# Patient Record
Sex: FEMALE | Race: WHITE | Employment: STUDENT | URBAN - METROPOLITAN AREA
[De-identification: names, ages, dates, MRNs, and addresses within clinical notes are randomized per-mention and may not be internally consistent; named-entity substitution may affect disease eponyms.]

---
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
## 2020-03-04 ENCOUNTER — OFFICE VISIT (OUTPATIENT)
Dept: DERMATOLOGY CLINIC | Facility: CLINIC | Age: 20
End: 2020-03-04
Payer: COMMERCIAL

## 2020-03-04 DIAGNOSIS — L70.0 ACNE VULGARIS: ICD-10-CM

## 2020-03-04 DIAGNOSIS — B07.9 VERRUCA(E): Primary | ICD-10-CM

## 2020-03-04 PROCEDURE — 99213 OFFICE O/P EST LOW 20 MIN: CPT | Performed by: DERMATOLOGY

## 2020-03-04 RX ORDER — AZELAIC ACID 0.15 G/G
GEL TOPICAL
Qty: 50 G | Refills: 6 | Status: SHIPPED | OUTPATIENT
Start: 2020-03-04 | End: 2021-08-24

## 2020-03-04 RX ORDER — CLINDAMYCIN PHOSPHATE 10 MG/ML
1 LOTION TOPICAL 2 TIMES DAILY
Qty: 60 ML | Refills: 11 | Status: SHIPPED | OUTPATIENT
Start: 2020-03-04 | End: 2020-04-03

## 2020-03-04 RX ORDER — POLYETHYLENE GLYCOL-3350 AND ELECTROLYTES 236; 6.74; 5.86; 2.97; 22.74 G/274.31G; G/274.31G; G/274.31G; G/274.31G; G/274.31G
POWDER, FOR SOLUTION ORAL
COMMUNITY
Start: 2019-10-01 | End: 2020-03-19

## 2020-03-06 ENCOUNTER — APPOINTMENT (OUTPATIENT)
Dept: CT IMAGING | Facility: HOSPITAL | Age: 20
End: 2020-03-06
Attending: NURSE PRACTITIONER
Payer: COMMERCIAL

## 2020-03-06 ENCOUNTER — HOSPITAL ENCOUNTER (EMERGENCY)
Facility: HOSPITAL | Age: 20
Discharge: HOME | End: 2020-03-06
Payer: COMMERCIAL

## 2020-03-06 VITALS
WEIGHT: 140 LBS | TEMPERATURE: 98 F | RESPIRATION RATE: 18 BRPM | DIASTOLIC BLOOD PRESSURE: 67 MMHG | HEIGHT: 64 IN | OXYGEN SATURATION: 98 % | SYSTOLIC BLOOD PRESSURE: 109 MMHG | HEART RATE: 70 BPM | BODY MASS INDEX: 23.9 KG/M2

## 2020-03-06 DIAGNOSIS — R10.84 ABDOMINAL PAIN, GENERALIZED: Primary | ICD-10-CM

## 2020-03-06 LAB
ALBUMIN SERPL-MCNC: 4.5 G/DL (ref 3.4–5)
ALP LIVER SERPL-CCNC: 58 U/L (ref 52–144)
ALT SERPL-CCNC: 37 U/L (ref 13–56)
ANION GAP SERPL CALC-SCNC: 7 MMOL/L (ref 0–18)
AST SERPL-CCNC: 33 U/L (ref 15–37)
B-HCG UR QL: NEGATIVE
BASOPHILS # BLD AUTO: 0.04 X10(3) UL (ref 0–0.2)
BASOPHILS NFR BLD AUTO: 0.4 %
BILIRUB DIRECT SERPL-MCNC: 0.1 MG/DL (ref 0–0.2)
BILIRUB SERPL-MCNC: 0.5 MG/DL (ref 0.1–2)
BILIRUB UR QL: NEGATIVE
BUN BLD-MCNC: 11 MG/DL (ref 7–18)
BUN/CREAT SERPL: 15.1 (ref 10–20)
CALCIUM BLD-MCNC: 9.8 MG/DL (ref 8.5–10.1)
CHLORIDE SERPL-SCNC: 103 MMOL/L (ref 98–112)
CLARITY UR: CLEAR
CO2 SERPL-SCNC: 29 MMOL/L (ref 21–32)
COLOR UR: YELLOW
CREAT BLD-MCNC: 0.73 MG/DL (ref 0.55–1.02)
DEPRECATED RDW RBC AUTO: 39.8 FL (ref 35.1–46.3)
EOSINOPHIL # BLD AUTO: 0.06 X10(3) UL (ref 0–0.7)
EOSINOPHIL NFR BLD AUTO: 0.6 %
ERYTHROCYTE [DISTWIDTH] IN BLOOD BY AUTOMATED COUNT: 12.3 % (ref 11–15)
GLUCOSE BLD-MCNC: 102 MG/DL (ref 70–99)
GLUCOSE UR-MCNC: NEGATIVE MG/DL
HCT VFR BLD AUTO: 44.3 % (ref 35–48)
HGB BLD-MCNC: 14.7 G/DL (ref 12–16)
HGB UR QL STRIP.AUTO: NEGATIVE
IMM GRANULOCYTES # BLD AUTO: 0.02 X10(3) UL (ref 0–1)
IMM GRANULOCYTES NFR BLD: 0.2 %
KETONES UR-MCNC: NEGATIVE MG/DL
LEUKOCYTE ESTERASE UR QL STRIP.AUTO: NEGATIVE
LYMPHOCYTES # BLD AUTO: 2.35 X10(3) UL (ref 1.5–5)
LYMPHOCYTES NFR BLD AUTO: 23.1 %
M PROTEIN MFR SERPL ELPH: 7.8 G/DL (ref 6.4–8.2)
MCH RBC QN AUTO: 29.3 PG (ref 26–34)
MCHC RBC AUTO-ENTMCNC: 33.2 G/DL (ref 31–37)
MCV RBC AUTO: 88.2 FL (ref 80–100)
MONOCYTES # BLD AUTO: 0.67 X10(3) UL (ref 0.1–1)
MONOCYTES NFR BLD AUTO: 6.6 %
NEUTROPHILS # BLD AUTO: 7.05 X10 (3) UL (ref 1.5–7.7)
NEUTROPHILS # BLD AUTO: 7.05 X10(3) UL (ref 1.5–7.7)
NEUTROPHILS NFR BLD AUTO: 69.1 %
NITRITE UR QL STRIP.AUTO: NEGATIVE
OSMOLALITY SERPL CALC.SUM OF ELEC: 288 MOSM/KG (ref 275–295)
PH UR: 6 [PH] (ref 5–8)
PLATELET # BLD AUTO: 292 10(3)UL (ref 150–450)
POTASSIUM SERPL-SCNC: 4.1 MMOL/L (ref 3.5–5.1)
PROT UR-MCNC: NEGATIVE MG/DL
RBC # BLD AUTO: 5.02 X10(6)UL (ref 3.8–5.3)
SODIUM SERPL-SCNC: 139 MMOL/L (ref 136–145)
SP GR UR STRIP: 1.01 (ref 1–1.03)
UROBILINOGEN UR STRIP-ACNC: <2
WBC # BLD AUTO: 10.2 X10(3) UL (ref 4–11)

## 2020-03-06 PROCEDURE — 96360 HYDRATION IV INFUSION INIT: CPT

## 2020-03-06 PROCEDURE — 99284 EMERGENCY DEPT VISIT MOD MDM: CPT

## 2020-03-06 PROCEDURE — 81003 URINALYSIS AUTO W/O SCOPE: CPT

## 2020-03-06 PROCEDURE — 74177 CT ABD & PELVIS W/CONTRAST: CPT | Performed by: NURSE PRACTITIONER

## 2020-03-06 PROCEDURE — 81025 URINE PREGNANCY TEST: CPT

## 2020-03-06 PROCEDURE — 80048 BASIC METABOLIC PNL TOTAL CA: CPT

## 2020-03-06 PROCEDURE — 80076 HEPATIC FUNCTION PANEL: CPT | Performed by: NURSE PRACTITIONER

## 2020-03-06 PROCEDURE — 85025 COMPLETE CBC W/AUTO DIFF WBC: CPT

## 2020-03-06 PROCEDURE — 96361 HYDRATE IV INFUSION ADD-ON: CPT

## 2020-03-06 RX ORDER — DICYCLOMINE HYDROCHLORIDE 10 MG/1
10 CAPSULE ORAL ONCE
Status: COMPLETED | OUTPATIENT
Start: 2020-03-06 | End: 2020-03-06

## 2020-03-06 RX ORDER — DICYCLOMINE HCL 20 MG
20 TABLET ORAL 4 TIMES DAILY PRN
Qty: 15 TABLET | Refills: 0 | Status: SHIPPED | OUTPATIENT
Start: 2020-03-06 | End: 2020-03-19

## 2020-03-06 NOTE — ED INITIAL ASSESSMENT (HPI)
Left flank pain radiating to left lower abdomen worsening for >2 months with constipation. Started colonoscopy prep yesterday at 1800 but stopped due to bloating, nausea, and vomiting.    Enema this morning made bloating worse now with severe abdominal pa

## 2020-03-06 NOTE — ED PROVIDER NOTES
Patient Seen in: [facility] AND [facility] Emergency Department      History   Patient presents with:  Abdomen/Flank Pain    Stated Complaint: abdominal pressure after drinking colonscopy mixture    HPI    23year old female presents to the ER complaining of sev Conjunctivae normal.   Neck:      Musculoskeletal: Neck supple. Cardiovascular:      Rate and Rhythm: Normal rate. Heart sounds: No murmur. Pulmonary:      Effort: Pulmonary effort is normal.      Breath sounds: Normal breath sounds.    Abdominal: hemodynamically stable and was instructed to follow-up with her GI specialist.   Strict return precautions given.     Imaging:   CT of the abdomen and pelvis with no acute intra-abdominal findings              Clinical impression as well as any lab results

## 2020-03-16 NOTE — PROGRESS NOTES
Prince Ng is a 23year old female. Patient presents with:  Verruca: LOV 10/7/19. pt presenting today with verruca f/u to R foot. Treated with cryo at last with improvement. Patient has no known allergies.   Current Outpatient Medicat Single      Spouse name: Not on file      Number of children: Not on file      Years of education: Not on file      Highest education level: Not on file    Occupational History      Occupation: senior at [employer]; [address] Self-Exams: Not Asked    Social History Narrative      Not on file    Family History   Problem Relation Age of Onset   • Lipids Father    • Hypertension Maternal Grandmother    • Cancer Neg    • Diabetes Neg    • Heart Disorder Neg                       HP legs, palms.   Remarkable for lesions as noted dermal papular nodule right arm consistent with dermatofibroma 8 mm, unchanged patchy area of alopecia at left anterior scalp overall improved with decreased density bi temporal scalp slightly; numerous hairs o recommend observation. Excision with plastics if desired    No other suspicious nevi. Pathophysiology discussed with patient. Therapeutic options reviewed. See  Medications in grid. Instructions reviewed at length.      General skin care questions an

## 2020-08-29 NOTE — ED NOTES
PT safe to DC home per MD. Liudmila Abreu to dress self. DC teaching done, pt verbalizes understanding. Ambulatory with steady gait to exit.
Yes - the patient is able to be screened

## 2021-03-03 ENCOUNTER — OFFICE VISIT (OUTPATIENT)
Dept: DERMATOLOGY CLINIC | Facility: CLINIC | Age: 21
End: 2021-03-03
Payer: COMMERCIAL

## 2021-03-03 DIAGNOSIS — L25.9 CONTACT DERMATITIS AND ECZEMA: ICD-10-CM

## 2021-03-03 DIAGNOSIS — B07.9 VERRUCA(E): ICD-10-CM

## 2021-03-03 DIAGNOSIS — D48.5 NEOPLASM OF UNCERTAIN BEHAVIOR OF SKIN: Primary | ICD-10-CM

## 2021-03-03 DIAGNOSIS — D23.9 DERMATOFIBROMA: ICD-10-CM

## 2021-03-03 PROCEDURE — 99213 OFFICE O/P EST LOW 20 MIN: CPT | Performed by: DERMATOLOGY

## 2021-03-14 NOTE — PROGRESS NOTES
Kenzie Encinas is a 21year old female. Patient presents with:  Warts: LOV 3/4/20. Warts on right foot. Cyst: Dime sized cyst on the right arm. Has been there a long time. Want to make sure it is ok. Would like a surgeon to remove it. daily. (Patient not taking: Reported on 3/3/2021 ) 1 Inhaler 11   • Levocetirizine Dihydrochloride (XYZAL) 5 MG Oral Tab Take 1 tablet (5 mg total) by mouth every evening.  (Patient not taking: Reported on 1/12/2021 ) 30 tablet 6   • Azelaic Acid (FINACEA) Not on file    Social Determinants of Health  Financial Resource Strain:       Difficulty of Paying Living Expenses:   Food Insecurity:       Worried About Running Out of Food in the Last Year:       Ran Out of Food in the Last Year:   Transportation Ne above conditions    Labs from last year reviewed.   Hormonal studies unremarkable including testosterone levels, TSH 17 hydroxyprogesterone tissue transglutaminase and a negative sed rate as well ,ferritin 51    Outside records reviewed as well  Patient pre slightly scaly red. Will use hydrocortisone, continue CeraVe a moisturizer, consider prescription topical steroids if worsening. Possible early rosacea with more background erythema tightness inflammatory lesions over the cheeks.   Metronidazole twice is[place] affected areas of face       Verruca(e)  (primary encounter diagnosis)  Alopecia areata  Hair loss  Benign neoplasm of skin of upper limb, including shoulder, unspecified laterality  Benign neoplasm of skin, unspecified location    No orders of the defined

## 2021-05-03 PROBLEM — K30 FUNCTIONAL DYSPEPSIA: Status: ACTIVE | Noted: 2021-05-03

## 2021-05-03 PROBLEM — K58.1 IRRITABLE BOWEL SYNDROME WITH CONSTIPATION: Status: ACTIVE | Noted: 2021-05-03

## 2021-06-25 ENCOUNTER — OFFICE VISIT (OUTPATIENT)
Dept: DERMATOLOGY CLINIC | Facility: CLINIC | Age: 21
End: 2021-06-25
Payer: COMMERCIAL

## 2021-06-25 DIAGNOSIS — K13.0 CHEILITIS: ICD-10-CM

## 2021-06-25 DIAGNOSIS — L30.9 DERMATITIS: Primary | ICD-10-CM

## 2021-06-25 PROCEDURE — 99213 OFFICE O/P EST LOW 20 MIN: CPT | Performed by: DERMATOLOGY

## 2021-06-25 RX ORDER — FLUTICASONE PROPIONATE 0.05 MG/G
OINTMENT TOPICAL
Qty: 15 G | Refills: 1 | Status: SHIPPED | OUTPATIENT
Start: 2021-06-25 | End: 2021-08-25

## 2021-06-25 RX ORDER — AZELASTINE 1 MG/ML
SPRAY, METERED NASAL
COMMUNITY
Start: 2021-05-22 | End: 2021-08-24

## 2021-06-25 RX ORDER — METRONIDAZOLE 7.5 MG/G
GEL TOPICAL
Qty: 60 G | Refills: 11 | Status: SHIPPED | OUTPATIENT
Start: 2021-06-25 | End: 2021-08-25

## 2021-06-25 RX ORDER — NAPROXEN 500 MG/1
500 TABLET ORAL 2 TIMES DAILY
COMMUNITY
Start: 2021-03-18 | End: 2021-08-24

## 2021-07-05 NOTE — PROGRESS NOTES
Roxi Gonzalez is a 21year old female. Patient presents with:  Derm Problem: LOV 3/321/. pt presenting today with dryness and peeling to top lip for about 3-4 months. Denies itching or pain. pt states dryness aomce and goes.  pt noticed some swelli 0.005 % External Ointment Use at bedtime to rash on lips 15 g 1   • Ergocalciferol (VITAMIN D OR) Take by mouth.      • Azelastine HCl 0.1 % Nasal Solution USE TWO SPRAYS IN EACH NOSTRIL TWO TIMES A DAY AS NEEDED (Patient not taking: Reported on 6/25/2021) Not Asked        Occupational Exposure: Not Asked        Hobby Hazards: Not Asked        Sleep Concern: Not Asked        Stress Concern: Not Asked        Weight Concern: Not Asked        Special Diet: Not Asked        Back Care: Not Asked        Exercise: Dry scaly area on upper lip 3 months isolated lesion peels flakes puffy at times subsides. Use various products. Notes redness of the cheeks. Fine papules. Patient presents with concerns above. ROS:    Denies any other systemic complaints. at length. General skin care questions answered. Reassurance regarding benign skin lesions. Signs and symptoms of skin cancer, ABCDE's of melanoma briefly reviewed. Sunscreen use, sun protection, encouraged. Followup as noted in rtc or p.r.n.     The

## 2021-11-04 ENCOUNTER — TELEPHONE (OUTPATIENT)
Dept: DERMATOLOGY CLINIC | Facility: CLINIC | Age: 21
End: 2021-11-04

## 2021-11-04 RX ORDER — CLINDAMYCIN PHOSPHATE 10 MG/ML
1 LOTION TOPICAL 2 TIMES DAILY
Qty: 60 ML | Refills: 5 | Status: SHIPPED | OUTPATIENT
Start: 2021-11-04

## 2021-11-04 NOTE — TELEPHONE ENCOUNTER
LOV 6/25/21 - Pt also seen at [facility] in October 2021. Pt asking for refill of Cleocin. Please advise if you would like this refilled?

## 2021-11-04 NOTE — TELEPHONE ENCOUNTER
Patients mother called    Asking for refill of Clindamycin Phosphate 1% eternal lotion    lov 6/25/21

## 2021-11-30 ENCOUNTER — OFFICE VISIT (OUTPATIENT)
Dept: OPTOMETRY | Facility: CLINIC | Age: 21
End: 2021-11-30
Payer: COMMERCIAL

## 2021-11-30 DIAGNOSIS — H52.13 MYOPIA OF BOTH EYES: Primary | ICD-10-CM

## 2021-11-30 PROCEDURE — 92015 DETERMINE REFRACTIVE STATE: CPT | Performed by: OPTOMETRIST

## 2021-11-30 PROCEDURE — 92002 INTRM OPH EXAM NEW PATIENT: CPT | Performed by: OPTOMETRIST

## 2021-11-30 NOTE — PROGRESS NOTES
Griffin Fleischer is a 21year old female. HPI:     HPI     Patient is in for an annual eye exam. .Last seen at [facility] in 2017 for an EE. Was having monocular diplopia but no pathology was found. Here for a second opinion because she still sees annalee chavez [facility]       Right Left     Full, Ortho Full, Ortho          Neuro/Psych     Oriented x3: Yes    Mood/Affect: Normal          Dilation     Both eyes: declined @ 2:48 PM            Additional Tests     Amsler       Right Left     Normal Normal

## 2021-11-30 NOTE — PATIENT INSTRUCTIONS
Myopia of both eyes  Gave copy of glasses if wanted. Advised RX is mild but will help with road signs and lights especially in the evening.

## 2021-11-30 NOTE — ASSESSMENT & PLAN NOTE
Gave copy of glasses if wanted. Advised RX is mild but will help with road signs and lights especially in the evening.

## 2021-11-30 NOTE — PROGRESS NOTES
Elizabeth Jarquin is a 21year old female. HPI:     HPI     Patient is in for an annual eye exam. .Last seen at [facility] in 2017 for an EE. Was having monocular diplopia but no pathology was found. Here for a second opinion because she still sees annalee chavez [facility]       Right Left     Full, Ortho Full, Ortho          Neuro/Psych     Oriented x3: Yes    Mood/Affect: Normal          Dilation     Both eyes: declined @ 2:48 PM            Additional Tests     Amsler       Right Left     Normal Normal

## 2022-06-29 ENCOUNTER — OFFICE VISIT (OUTPATIENT)
Dept: DERMATOLOGY CLINIC | Facility: CLINIC | Age: 22
End: 2022-06-29
Payer: COMMERCIAL

## 2022-06-29 DIAGNOSIS — L60.8 NAIL DISCOLORATION: ICD-10-CM

## 2022-06-29 DIAGNOSIS — D23.9 BENIGN NEOPLASM OF SKIN, UNSPECIFIED LOCATION: ICD-10-CM

## 2022-06-29 DIAGNOSIS — L30.9 DERMATITIS: ICD-10-CM

## 2022-06-29 DIAGNOSIS — D23.9 DERMATOFIBROMA: ICD-10-CM

## 2022-06-29 DIAGNOSIS — D48.5 NEOPLASM OF UNCERTAIN BEHAVIOR OF SKIN: Primary | ICD-10-CM

## 2022-06-29 PROCEDURE — 99213 OFFICE O/P EST LOW 20 MIN: CPT | Performed by: DERMATOLOGY

## 2022-06-29 RX ORDER — CLINDAMYCIN PHOSPHATE 10 UG/ML
1 LOTION TOPICAL 2 TIMES DAILY
Qty: 60 ML | Refills: 6 | Status: SHIPPED | OUTPATIENT
Start: 2022-06-29

## 2023-07-14 ENCOUNTER — TELEPHONE (OUTPATIENT)
Dept: DERMATOLOGY CLINIC | Facility: CLINIC | Age: 23
End: 2023-07-14

## 2023-07-14 ENCOUNTER — OFFICE VISIT (OUTPATIENT)
Dept: DERMATOLOGY CLINIC | Facility: CLINIC | Age: 23
End: 2023-07-14

## 2023-07-14 DIAGNOSIS — L65.9 HAIR LOSS: ICD-10-CM

## 2023-07-14 DIAGNOSIS — L30.9 DERMATITIS: Primary | ICD-10-CM

## 2023-07-14 DIAGNOSIS — R23.8 SKIN SENSITIVITY: ICD-10-CM

## 2023-07-14 PROCEDURE — 99215 OFFICE O/P EST HI 40 MIN: CPT | Performed by: DERMATOLOGY

## 2023-07-14 RX ORDER — AZELAIC ACID 0.15 G/G
1 AEROSOL, FOAM TOPICAL DAILY
Qty: 50 G | Refills: 12 | Status: SHIPPED | OUTPATIENT
Start: 2023-07-14

## 2023-07-14 RX ORDER — RUXOLITINIB 15 MG/G
1 CREAM TOPICAL EVERY EVENING
Qty: 60 G | Refills: 2 | Status: SHIPPED | OUTPATIENT
Start: 2023-07-14 | End: 2023-07-15

## 2023-07-14 RX ORDER — CLINDAMYCIN PHOSPHATE 10 UG/ML
1 LOTION TOPICAL 2 TIMES DAILY
Qty: 60 ML | Refills: 6 | Status: SHIPPED | OUTPATIENT
Start: 2023-07-14

## 2023-07-14 RX ORDER — CLOBETASOL PROPIONATE 0.05 G/100ML
SHAMPOO TOPICAL
Qty: 120 ML | Refills: 11 | Status: SHIPPED | OUTPATIENT
Start: 2023-07-14

## 2023-07-14 NOTE — TELEPHONE ENCOUNTER
LOV today, 7/14/23 - Dr. Olman Bruno - copay is $900 with patient's insurance. Ok to send to [facility]  if pt ok with this? Thank you.

## 2023-07-15 RX ORDER — RUXOLITINIB 15 MG/G
1 CREAM TOPICAL EVERY EVENING
Qty: 60 G | Refills: 2 | Status: SHIPPED | OUTPATIENT
Start: 2023-07-15

## 2023-07-15 NOTE — TELEPHONE ENCOUNTER
For Koffi Adames? [facility] [place] and please give family info  this may still need pa.   Rx sent to THE [facility] [place]

## 2023-07-17 ENCOUNTER — LAB ENCOUNTER (OUTPATIENT)
Dept: LAB | Age: 23
End: 2023-07-17
Attending: DERMATOLOGY
Payer: COMMERCIAL

## 2023-07-17 DIAGNOSIS — L30.9 DERMATITIS: ICD-10-CM

## 2023-07-17 DIAGNOSIS — L65.9 HAIR LOSS: ICD-10-CM

## 2023-07-17 LAB
CRP SERPL-MCNC: <0.29 MG/DL (ref ?–0.3)
DEPRECATED HBV CORE AB SER IA-ACNC: 35.8 NG/ML
DHEA-S SERPL-MCNC: 227.7 UG/DL
TSI SER-ACNC: 1.09 MIU/ML (ref 0.36–3.74)
VIT D+METAB SERPL-MCNC: 22.5 NG/ML (ref 30–100)

## 2023-07-17 PROCEDURE — 84410 TESTOSTERONE BIOAVAILABLE: CPT

## 2023-07-17 PROCEDURE — 36415 COLL VENOUS BLD VENIPUNCTURE: CPT

## 2023-07-17 PROCEDURE — 82728 ASSAY OF FERRITIN: CPT

## 2023-07-17 PROCEDURE — 82306 VITAMIN D 25 HYDROXY: CPT

## 2023-07-17 PROCEDURE — 86140 C-REACTIVE PROTEIN: CPT

## 2023-07-17 PROCEDURE — 82627 DEHYDROEPIANDROSTERONE: CPT

## 2023-07-17 PROCEDURE — 84443 ASSAY THYROID STIM HORMONE: CPT

## 2023-07-17 PROCEDURE — 86038 ANTINUCLEAR ANTIBODIES: CPT

## 2023-07-18 NOTE — TELEPHONE ENCOUNTER
Left detailed message for pt's mom regarding [place] and [id number] [facility].

## 2023-07-18 NOTE — TELEPHONE ENCOUNTER
Patients mother called    Why was rx sent to [facility]? Can she have lab results.  Please call

## 2023-07-19 NOTE — TELEPHONE ENCOUNTER
S/w pt's mother (HIPAA confirmed) reviewed labs as per KMT's note:    \"  The vitamin D is a little bit low. I would recommend weekly vitamin D prescription. We will send to your pharmacy. Iron stores, ferritin, is slightly low for hair loss that should be over 50 iron supplementation over-the-counter may be helpful. Hormone levels, DHEA-S normal testosterone pending. Often it is helpful with hair shedding to supplement both vitamin D and iron together. Inflammatory marker is low thyroid normal remainder of labs are pending. I will let you know when these are back \"    Mom voiced understanding. She states she noted a msg from lab that certain labs can be skewed due to biotin - she denies pt taking biotin but would like to know which labs can be altered by it - please confirm Dr. Leatha Flowers, I can't remember if that was ferritin? TSH also?

## 2023-07-20 LAB — NUCLEAR IGG TITR SER IF: NEGATIVE {TITER}

## 2023-07-21 LAB
SEX HORM BIND GLOB: 48.5 NMOL/L
TESTOST % FREE+WEAK BND: 15.2 %
TESTOST FREE+WEAK BND: 5.1 NG/DL
TESTOSTERONE TOT /MS: 33.5 NG/DL

## 2023-07-29 NOTE — PROGRESS NOTES
Selene Shook is a 25year old female. HPI:     CC:  Patient presents with:  Derm Problem: LOV 6/29/22. Patient present for itchy scalp for 4 years. Notes, there's a waxy substance, along with white flaky dandruff. There is a burning sensation, and some hair loss. Patient tried OTC shampoo, to aid with the burning sensation with no improvement. The burning sensation returned 30 minutes after washing her hair. Allergies:  Patient has no known allergies. HISTORY:    Past Medical History:   Diagnosis Date    Abdominal pain     Constipation     Eustachian tube dysfunction     WALKER      History reviewed. No pertinent surgical history. Family History   Problem Relation Age of Onset    Lipids Father     Hypertension Maternal Grandmother     Cancer Neg     Diabetes Neg     Heart Disorder Neg     Glaucoma Neg       Social History     Socioeconomic History    Marital status: Single   Occupational History    Occupation: senior at [employer]; BME/[employer] performance   Tobacco Use    Smoking status: Never    Smokeless tobacco: Never   Vaping Use    Vaping Use: Never used   Substance and Sexual Activity    Alcohol use: No    Drug use: No   Other Topics Concern    Grew up on a farm No    History of tanning No    Outdoor occupation Yes     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 50 g 12    clindamycin 1 % External Lotion Apply 1 Application  topically 2 (two) times daily. Apply thin film to affected area(s).  60 mL 6    Ergocalciferol (VITAMIN D OR) Take by mouth.      ergocalciferol 1.25 MG (70616 UT) Oral Cap Take 1 capsule (50,000 Units total) by mouth once a week. 12 capsule 1    Ruxolitinib Phosphate (OPZELURA) 1.5 % External Cream Apply 1 Application topically every evening. 60 g 2     Allergies:   No Known Allergies    Past Medical History:   Diagnosis Date    Abdominal pain     Constipation     Eustachian tube dysfunction     WALKER     History reviewed. No pertinent surgical history.   Social History    Socioeconomic History      Marital status: Single      Spouse name: Not on file      Number of children: Not on file      Years of education: Not on file      Highest education level: Not on file    Occupational History      Occupation: senior at [place]; BME/v[employer] performance    Tobacco Use      Smoking status: Never      Smokeless tobacco: Never    Vaping Use      Vaping Use: Never used    Substance and Sexual Activity      Alcohol use: No      Drug use: No      Sexual activity: Not on file    Other Topics      Concerns:        Grew up on a farm: No        History of tanning: No        Outdoor occupation: Yes          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        Breast feeding: No        Reaction to local anesthetic: No         Service: Not Asked        Blood Transfusions: Not Asked        Caffeine Concern: Not Asked        Occupational Exposure: Not Asked        Hobby Hazards: Not Asked        Sleep Concern: Not Asked        Stress Concern: Not Asked        Weight Concern: Not Asked        Special Diet: Not Asked        Back Care: Not Asked        Exercise: Not Asked        Bike Helmet: Not Asked        Seat Belt: Yes        Self-Exams: Not Asked    Social History Narrative      Not on file    Social Determinants of Health  Financial Resource Strain: Not on file  Food Insecurity: Not on file  Transportation Needs: Not on file  Physical Activity: Not on file  Stress: Not on file  Social Connections: Not on file  Housing Stability: Not on file  Family History   Problem Relation Age of Onset    Lipids Father     Hypertension Maternal Grandmother     Cancer Neg     Diabetes Neg     Heart Disorder Neg     Glaucoma Neg        There were no vitals filed for this visit. HPI:  Patient presents with:  Derm Problem: LOV 6/29/22. Patient present for itchy scalp for 4 years. Notes, there's a waxy substance, along with white flaky dandruff. There is a burning sensation, and some hair loss. Patient tried OTC shampoo, to aid with the burning sensation with no improvement. The burning sensation returned 30 minutes after washing her hair. Follow-up several concerns. Scalp itching sensitivity has been going on for several years worsening notes some flaking and scaling which appears constantly even after washing her hair does use dandruff shampoo has tried variety of different products notes more greasy waxy buildup. Hair seems greasy by the end of the day sometimes washes hair twice a day. Hair loss has persisted stinging burning sensation at times. Does not seem to be seasonal and not related to sun exposure. Has otherwise generally been healthy. Previously labs noncontributory. Had evaluation previously at [facility] possible alopecia areata. Patient has noted more erythema and scaling on the hairline. Concerned with hairline, anterior scalp and temple decreased density. Is at school in [place]. Generally careful with sun protection. Notes more prominent acne. Menses regular. Has been doing laser hair removal for facial hair. Previous hormonal evaluation had been normal.  nothing is really new or different. Stress manageable does not seem to be related to school or activities occasionally take Zyrtec as needed for allergies    No other over-the-counter medications. Is taking vitamin D    Patient presents with concerns above. Patient has been in their usual state of health. Past notes/ records and appropriate/relevant lab results including pathology and past body maps reviewed.  Including outside notes/ PCP notes as appropriate. Updated and new information noted in current visit. ROS:  Denies other relevant systemic complaints. History, medications, allergies reviewed as noted. Physical Examination:     Well-developed well-nourished patient alert oriented in no acute distress. Exam performed, including scalp, head, neck, face,nails, hair, external eyes, including conjunctival mucosa, eyelids, lips external ears , arms, digits,palms. Multiple light to medium brown, well marginated, uniformly pigmented, macules and papules 6 mm and less scattered on exam. pigmented lesions examined with dermoscopy benign-appearing patterns. Dermatofibroma unchanged right arm    See map today's date for lesions noted . See assessment and plan below for specific lesions. Otherwise remarkable for lesions as noted on map. See A/P  below for additional information:    Assessment / plan:    Orders Placed This Encounter      Anti-Nuclear Antibody (KIMBERLY) by IFA Screen, Reflex Titer      Dehydroepiandrosterone Sulfate      Testosterone,Total and Weakly Bound w/ SHBG      C-Reactive Protein      Ferritin      Vitamin D      TSH W Reflex To Free T4      Meds & Refills for this Visit:  Requested Prescriptions     Signed Prescriptions Disp Refills    Clobetasol Propionate (CLOBEX) 0.05 % External Shampoo 120 mL 11     Sig: Apply to scalp leave on 5-10mintues, lather, rinse out 3 times weekly as directed    Azelaic Acid (FINACEA) 15 % External Foam 50 g 12     Sig: Apply 1 Application topically daily. clindamycin 1 % External Lotion 60 mL 6     Sig: Apply 1 Application  topically 2 (two) times daily. Apply thin film to affected area(s). Dermatitis  (primary encounter diagnosis)  Hair loss  Skin sensitivity    Over the scalp more diffusely mild scale thicker scale, hair casts noted, increased waxy scale, hairs of varying lengths throughout the anterior scalp decreased temporal and anterior hairline with shorter hairs. No obvious discrete patches. Hair pull negative  No evidence of scarring process  Eyebrows eyelashes normal  No other obvious rashes there is some dermatitic change in the background on scalp ears postauricular scalp. Arms with history of keratosis pilaris overall is fairly stable few eczematous areas    Atopic dermatitis,  trial of Opzelura, will use this along the hairline for scalp dermatitis likely atopic as well. Due to the location on face and hairline topical steroids high potency would be relatively contraindicated, Clobex shampoo for scaling flaking, will check labs as noted. Zyrtec daily, evidence that this can be helpful in alopecia areata. At present would not recommend any systemic Terrell inhibitor or hydroxychloroquine unless worsening, or other inflammatory process uncovered with labs. Consider biopsy. Consider in the future, possible minoxidil may be helpful start every other day 5% foam keep away from anterior hairline temples as this may aggravate her hirsutism    In view of acne as well, history of hirsutism we will recheck hormonal studies. Supportive care with continuation of vitamin D, multivitamin supplement. Biotin may or may not be helpful.  3 to 4 days before any lab work, consider collagen supplementation as well, adequate protein  Consider spironolactone    Acne. See medications in grid. Skin care regimen discussed at length including cleansers, makeup, face washing, sunscreen. Recheck in 6 -8 weeks if no improvement. Notify us promptly if problems tolerating regimen. Consider more aggressive therapy if not responding.   Over-the-counter topicals discussed as well niacinamide  With family history of breast cancer be cautious with any over-the-counter supplements containing Aswaganda        Azelaic acid, clindamycin more inflammatory lesions has had significant peeling in the past with topical retinoids concern with sensitivity with laser hair removal and retinoids    Dermatofibroma observe, benign-appearing nevi continue sun protection pigmented bands and nails have been stable  No other susupicious lesions on todays  exam.      Please refer to map for specific lesions. See additional diagnoses. Pros cons of various therapies, risks benefits discussed. Pathophysiology discussed with patient. Therapeutic options reviewed. See  Medications in grid. Instructions reviewed at length. Benign nevi, seborrheic  keratoses, cherry angiomas:  Reassurance regarding other benign skin lesions. Signs and symptoms of skin cancer, ABCDE's of melanoma discussed with patient. Sunscreen use, sun protection, self exams reviewed. Followup as noted RTC ---routine checkup    6 mos -one year or p.r.n. Encounter Times   Including precharting, reviewing chart including outside labs, previous notes, outside notes, prior notes obtaining history: 15 minutes, medical exam :10 minutes, notes on body map, plan, counseling 15minutes My total time spent caring for the patient on the day of the encounter: 40 minutes     The patient indicates understanding of these issues and agrees to the plan. The patient is asked to return as noted in follow-up/ above. This note was generated using Dragon voice recognition software. Please contact me regarding any confusion resulting from errors in recognition. .  Note to patient and family: [facility] makes medical notes like these available to patients. However, be advised this is a medical document. It is intended as [id number] communication and monitoring of a patient's care needs. It is written in medical language and may contain abbreviations or verbiage that are unfamiliar. It may appear blunt or direct. Medical documents are intended to carry relevant information, facts as evident and the clinical opinion of the practitioner.

## 2023-08-01 ENCOUNTER — TELEPHONE (OUTPATIENT)
Dept: DERMATOLOGY CLINIC | Facility: CLINIC | Age: 23
End: 2023-08-01

## 2023-09-08 NOTE — TELEPHONE ENCOUNTER
Spoke to mom. Zayra Montalvo is studying in [place] and running out of Clobex prescription. OK to [place] of [place]? Awaiting pharmacy details from mom. Mom also wants to know how long it will take to see improvement with Grecia Cook. Currently 8 weeks of treatment and is seeing new hair growth but equivalent hair loss (some days around 140 strands).

## 2023-09-08 NOTE — TELEPHONE ENCOUNTER
Mom called for daughter to get refill on medication below. Clobetasol Propionate (CLOBEX) 0.05 % External Shampoo, Apply to scalp leave on 5-10mintues, lather, rinse out 3 times weekly as directed, Disp: 120 mL, Rfl: 6      Mom asked to be called back she has questions for the nurse to answer.

## 2023-09-09 NOTE — TELEPHONE ENCOUNTER
Yes, may send rf's to her pharmacy at school. Await pharmacy info.   May take 4-5 months to see a change in hair growth with the opzelura

## 2023-09-09 NOTE — TELEPHONE ENCOUNTER
Pt's mom returned call and states she does not have the address yet but will call the office with the pharmacy and address as soon as she it.

## 2023-09-11 RX ORDER — CLOBETASOL PROPIONATE 0.05 G/100ML
SHAMPOO TOPICAL
Qty: 120 ML | Refills: 11 | Status: SHIPPED | OUTPATIENT
Start: 2023-09-11

## 2023-09-11 NOTE — TELEPHONE ENCOUNTER
Pt's mom returned call and requested rx to be sent to [facility] on [address] and pt's mom will take it to pt at school. Rx pended.   Thank you

## 2023-11-09 ENCOUNTER — TELEPHONE (OUTPATIENT)
Dept: DERMATOLOGY CLINIC | Facility: CLINIC | Age: 23
End: 2023-11-09

## 2023-11-13 NOTE — TELEPHONE ENCOUNTER
LOV 7/14/23 - Spoke with pt's mom and she states pt is using the Clobex shampoo as prescribed but is asking if there is an otc or rx shampoo that pt can use to help with the greasy was buildup she is still experiencing? Please advise. Staff - pt's mom is requesting that the office please leave a detailed message on her vm if she does not answer. Thank you.

## 2023-12-30 ENCOUNTER — LAB ENCOUNTER (OUTPATIENT)
Dept: LAB | Age: 23
End: 2023-12-30
Attending: DERMATOLOGY
Payer: COMMERCIAL

## 2023-12-30 ENCOUNTER — OFFICE VISIT (OUTPATIENT)
Dept: DERMATOLOGY CLINIC | Facility: CLINIC | Age: 23
End: 2023-12-30

## 2023-12-30 DIAGNOSIS — L20.84 INTRINSIC ATOPIC DERMATITIS: ICD-10-CM

## 2023-12-30 DIAGNOSIS — Z51.81 MEDICATION MONITORING ENCOUNTER: ICD-10-CM

## 2023-12-30 DIAGNOSIS — L65.9 HAIR LOSS: ICD-10-CM

## 2023-12-30 DIAGNOSIS — E55.9 VITAMIN D DEFICIENCY DISEASE: ICD-10-CM

## 2023-12-30 DIAGNOSIS — Z51.81 MEDICATION MONITORING ENCOUNTER: Primary | ICD-10-CM

## 2023-12-30 LAB — VIT D+METAB SERPL-MCNC: 33.2 NG/ML (ref 30–100)

## 2023-12-30 PROCEDURE — 82306 VITAMIN D 25 HYDROXY: CPT

## 2023-12-30 PROCEDURE — 99213 OFFICE O/P EST LOW 20 MIN: CPT | Performed by: DERMATOLOGY

## 2023-12-30 PROCEDURE — 36415 COLL VENOUS BLD VENIPUNCTURE: CPT

## 2024-01-02 ENCOUNTER — TELEPHONE (OUTPATIENT)
Dept: ORTHOPEDICS CLINIC | Facility: CLINIC | Age: 24
End: 2024-01-02

## 2024-01-02 DIAGNOSIS — M79.662 PAIN OF LEFT LOWER LEG: Primary | ICD-10-CM

## 2024-01-02 DIAGNOSIS — M25.552 PAIN OF LEFT HIP: ICD-10-CM

## 2024-01-02 NOTE — TELEPHONE ENCOUNTER
Future Appointments   Date Time Provider Department Center   1/8/2024  2:00 PM Milton Murhpy,  [id number]       This patient is coming for LT Tib/ Fibula pain. There was imaging done in epic. Please advise if additional views are needed for this appt. Thanks.    Patient may be reached at [phone]

## 2024-01-02 NOTE — TELEPHONE ENCOUNTER
LMTCB on unidentified voicemail (mom's phone number, too)     Need more information regarding pain location-

## 2024-01-02 NOTE — PROGRESS NOTES
eRmy Zarco is a 21year old female. HPI:     CC:    Chief Complaint   Patient presents with    Derm Problem     LOV 7/2023. Pt presents for f/u on itchy scalp. Rx Clobetasol Propionate (CLOBEX) 0.05 % External Shampoo pt has seen improvement. No concerns at this time. Alopecia     Pt presents of f/u on hair loss to the scalp. Rx Ruxolitinib Phosphate (OPZELURA) 1.5 % External Cream with good improvement. Would like to discuss how long she will have to use medications. Pt would like to check Vit D level (7/2023 last lab) to see if her level has increased since completing Vit D supplementation. Allergies:  Patient has no known allergies. HISTORY:    Past Medical History:   Diagnosis Date    Abdominal pain     Constipation     Eustachian tube dysfunction     WALKER      History reviewed. No pertinent surgical history. Family History   Problem Relation Age of Onset    Lipids Father     Hypertension Maternal Grandmother     Cancer Neg     Diabetes Neg     Heart Disorder Neg     Glaucoma Neg       Social History     Socioeconomic History    Marital status: Single   Occupational History    Occupation: senior at [employer]; BME/vocal performance   Tobacco Use    Smoking status: Never    Smokeless tobacco: Never   Vaping Use    Vaping Use: Never used   Substance and Sexual Activity    Alcohol use: No    Drug use: No   Other Topics Concern    Grew up on a farm No    History of tanning No    Outdoor occupation Yes     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  (Patient not taking: Reported on 12/30/2023) 12 capsule 1    Ruxolitinib Phosphate (OPZELURA) 1.5 % External Cream Apply 1 Application topically every evening. 60 g 2    Azelaic Acid (FINACEA) 15 % External Foam Apply 1 Application topically daily. 50 g 12    clindamycin 1 % External Lotion Apply 1 Application  topically 2 (two) times daily. Apply thin film to affected area(s). 60 mL 6    Ergocalciferol (VITAMIN D OR) Take by mouth. (Patient not taking: Reported on 12/30/2023)       Allergies:   No Known Allergies    Past Medical History:   Diagnosis Date    Abdominal pain     Constipation     Eustachian tube dysfunction     WALKER     History reviewed. No pertinent surgical history.   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    Breast feeding No    Reaction to local anesthetic No     Service Not Asked    Blood Transfusions Not Asked    Caffeine Concern Not Asked    Occupational Exposure Not Asked    Hobby Hazards Not Asked    Sleep Concern Not Asked    Stress Concern Not Asked    Weight Concern Not Asked    Special Diet Not Asked    Back Care Not Asked    Exercise Not Asked    Bike Helmet Not Asked    Seat Belt Yes    Self-Exams Not Asked    Pt has a pacemaker Not Asked    Pt has a defibrillator No   Social History Narrative    Not on file     Social Determinants of Health     Financial Resource Strain: Not on file   Food Insecurity: Not on file   Transportation Needs: Not on file   Physical Activity: Not on file   Stress: Not on file   Social Connections: Not on file   Housing Stability: Not on file     Family History   Problem Relation Age of Onset    Lipids Father     Hypertension Maternal Grandmother     Cancer Neg     Diabetes Neg     Heart Disorder Neg     Glaucoma Neg        There were no vitals filed for this visit. HPI:  Chief Complaint   Patient presents with    Derm Problem     LOV 7/2023. Pt presents for f/u on itchy scalp. Rx Clobetasol Propionate (CLOBEX) 0.05 % External Shampoo pt has seen improvement. No concerns at this time. Alopecia     Pt presents of f/u on hair loss to the scalp. Rx Ruxolitinib Phosphate (OPZELURA) 1.5 % External Cream with good improvement. Would like to discuss how long she will have to use medications. Pt would like to check Vit D level (7/2023 last lab) to see if her level has increased since completing Vit D supplementation. Follow-up itching scaling scalp has not improved with clobetasol shampoo tolerating uses this couple times a week. Has noted new hair growth. Since beginning Grecia Signs to anterior scalp has noted decrease sensitivity discomfort. Waxy buildup has improved. Has noted regrowth along the anterior hairline as well  Notes scaling dry skin over the forehead nose alar creases has not been using the Grecia Signs in these areas    Is at school in [place]. Generally careful with sun protection. Notes more prominent acne. Menses regular. Has been doing laser hair removal for facial hair. Previous hormonal evaluation had been normal.  nothing is really new or different. Stress manageable does not seem to be related to school or activities occasionally take Zyrtec as needed for allergies    No other over-the-counter medications. Is taking vitamin D    Patient presents with concerns above. Patient has been in their usual state of health.      Past notes/ records and appropriate/relevant lab results including pathology and past body maps reviewed. Including outside notes/ PCP notes as appropriate. Updated and new information noted in current visit. ROS:  Denies other relevant systemic complaints. History, medications, allergies reviewed as noted. Physical Examination:     Well-developed well-nourished patient alert oriented in no acute distress. Exam performed, including scalp, head, neck, face,nails, hair, external eyes, including conjunctival mucosa, eyelids, lips external ears , arms, digits,palms. Multiple light to medium brown, well marginated, uniformly pigmented, macules and papules 6 mm and less scattered on exam. pigmented lesions examined with dermoscopy benign-appearing patterns. Dermatofibroma unchanged right arm    See map today's date for lesions noted . See assessment and plan below for specific lesions. Otherwise remarkable for lesions as noted on map. See A/P  below for additional information:    Assessment / plan:    Orders Placed This Encounter   Procedures    Vitamin D       Meds & Refills for this Visit:  Requested Prescriptions      No prescriptions requested or ordered in this encounter         Encounter Diagnoses   Name Primary? Medication monitoring encounter Yes    Hair loss     Vitamin D deficiency disease        Erythema scaling over the scalp has resolved. New hairs noted along the anterior hairline still some decreased density toward the left. [place] area has improved with numerous shorter hairs hair pull negative.   Tenderness resolved  Background of mild dermatitic changes over the forehead alar creases  Overall significant improvement in hair loss consistent with alopecia areata  Tolerating Opzelura  Will use more consistently for atopic dermatitis on the forehead nose  May use twice daily    Arms with history of keratosis pilaris overall is fairly stable few eczematous areas    Atopic dermatitis, trial of Dominique Reyna, will use this along the hairline for scalp dermatitis likely atopic as well. Due to the location on face and hairline topical steroids high potency would be relatively contraindicated, Clobex shampoo for scaling flaking, will check labs as noted. Overall has been helpful  Zyrtec daily, evidence that this can be helpful in alopecia areata. At present would not recommend any systemic Terrell inhibitor or hydroxychloroquine unless worsening, or other inflammatory process uncovered with labs. Consider in the future, possible minoxidil may be helpful start every other day 5% foam keep away from anterior hairline temples as this may aggravate her hirsutism continue possible addition sparingly    DHEA-S and testosterone levels are normal    Supportive care with continuation of vitamin D, multivitamin supplement. Labs at today's visit biotin may or may not be helpful.  3 to 4 days before any lab work, consider collagen supplementation as well, adequate protein  Consider spironolactone    Acne. See medications in grid. Skin care regimen discussed at length including cleansers, makeup, face washing, sunscreen. Recheck in 6 -8 weeks if no improvement. Notify us promptly if problems tolerating regimen. Consider more aggressive therapy if not responding. Over-the-counter topicals discussed as well niacinamide  With family history of breast cancer be cautious with any over-the-counter supplements containing Aswaganda    Overall acne has been doing well    Azelaic acid, clindamycin more inflammatory lesions has had significant peeling in the past with topical retinoids concern with sensitivity with laser hair removal and retinoids    Dermatofibroma observe, benign-appearing nevi continue sun protection pigmented bands and nails have been stable  No other susupicious lesions on todays  exam.      Please refer to map for specific lesions. See additional diagnoses.   Pros cons of various therapies, risks benefits discussed. Pathophysiology discussed with patient. Therapeutic options reviewed. See  Medications in grid. Instructions reviewed at length. Benign nevi, seborrheic  keratoses, cherry angiomas:  Reassurance regarding other benign skin lesions. Signs and symptoms of skin cancer, ABCDE's of melanoma discussed with patient. Sunscreen use, sun protection, self exams reviewed. Followup as noted RTC ---routine checkup    6 mos -one year or p.r.n. Encounter Times   Including precharting, reviewing chart including outside labs, previous notes, outside notes, prior notes obtaining history: 15 minutes, medical exam :10 minutes, notes on body map, plan, counseling 15minutes My total time spent caring for the patient on the day of the encounter: 40 minutes     The patient indicates understanding of these issues and agrees to the plan. The patient is asked to return as noted in follow-up/ above. This note was generated using Dragon voice recognition software. Please contact me regarding any confusion resulting from errors in recognition. .  Note to patient and family: The [facility] makes medical notes like these available to patients. However, be advised this is a medical document. It is intended as [id number] communication and monitoring of a patient's care needs. It is written in medical language and may contain abbreviations or verbiage that are unfamiliar. It may appear blunt or direct. Medical documents are intended to carry relevant information, facts as evident and the clinical opinion of the practitioner.

## 2024-01-02 NOTE — TELEPHONE ENCOUNTER
Patients mom called for daughters left thigh pain. Please advise for imaging   Future Appointments   Date Time Provider Department Center   1/8/2024  2:30 PM [id number]   1/11/2024  3:30 PM Alen Hazel DO [facility]

## 2024-01-04 NOTE — TELEPHONE ENCOUNTER
Spoke with patient's Mom who was returning a call from Anne clarifying location of pain to left leg.  Pt states it is her left hip and goes down entire leg.  Left hip xray added.    Xray ordered, please schedule, pt already knows to come in early for the tib/fib xray too.  Thank you!

## 2024-01-08 ENCOUNTER — HOSPITAL ENCOUNTER (OUTPATIENT)
Dept: GENERAL RADIOLOGY | Age: 24
Discharge: HOME | End: 2024-01-08
Attending: FAMILY MEDICINE
Payer: COMMERCIAL

## 2024-01-08 ENCOUNTER — OFFICE VISIT (OUTPATIENT)
Dept: ORTHOPEDICS CLINIC | Facility: CLINIC | Age: 24
End: 2024-01-08
Payer: COMMERCIAL

## 2024-01-08 VITALS
BODY MASS INDEX: 24.16 KG/M2 | OXYGEN SATURATION: 98 % | HEART RATE: 80 BPM | DIASTOLIC BLOOD PRESSURE: 92 MMHG | WEIGHT: 145 LBS | SYSTOLIC BLOOD PRESSURE: 130 MMHG | HEIGHT: 65 IN | RESPIRATION RATE: 18 BRPM

## 2024-01-08 DIAGNOSIS — M25.50 HYPERMOBILITY ARTHRALGIA: ICD-10-CM

## 2024-01-08 DIAGNOSIS — M76.32 IT BAND SYNDROME, LEFT: Primary | ICD-10-CM

## 2024-01-08 DIAGNOSIS — Q79.60 EHLERS-DANLOS SYNDROME: ICD-10-CM

## 2024-01-08 DIAGNOSIS — M79.662 PAIN OF LEFT LOWER LEG: ICD-10-CM

## 2024-01-08 PROCEDURE — 73590 X-RAY EXAM OF LOWER LEG: CPT | Performed by: FAMILY MEDICINE

## 2024-01-08 PROCEDURE — 3008F BODY MASS INDEX DOCD: CPT | Performed by: FAMILY MEDICINE

## 2024-01-08 PROCEDURE — 3075F SYST BP GE 130 - 139MM HG: CPT | Performed by: FAMILY MEDICINE

## 2024-01-08 PROCEDURE — 99204 OFFICE O/P NEW MOD 45 MIN: CPT | Performed by: FAMILY MEDICINE

## 2024-01-08 PROCEDURE — 3080F DIAST BP >= 90 MM HG: CPT | Performed by: FAMILY MEDICINE

## 2024-01-08 RX ORDER — DICLOFENAC SODIUM 75 MG/1
75 TABLET, DELAYED RELEASE ORAL 2 TIMES DAILY
Qty: 28 TABLET | Refills: 1 | Status: SHIPPED | OUTPATIENT
Start: 2024-01-08

## 2024-01-08 NOTE — H&P
Sports Medicine Clinic Note     Subjective:    Chief Complaint   Patient presents with    Other     Left leg pain 3 months     History of Present Illness:  A 23-year-old female, a pre-med college student studying Neuroscience in [place], presents with progressively worsening left lateral leg pain over several months. She denies any history of acute trauma or injury. The patient is an active swimmer and believes that recent breaststroke exercises and long walks might have exacerbated her condition. She reports no numbness, tingling, or other neurological symptoms. The patient has a longstanding history of hypermobility and migratory arthralgias, dating back to early childhood. She experiences chronic pain and subluxations in bilateral shoulders, hips, knees, and other intermediate and small joints, with the pain in the hips and shoulders being chronic and currently not acutely worsened. The patient has not previously undergone physical therapy for this specific issue but has found some benefit from it for general arthralgias in the past.    Objective:    General Appearance: The patient appears comfortable and in no acute distress.    Musculoskeletal Exam:    Inspection of the Left Leg: No visible deformity, erythema, or swelling.  Palpation: Tenderness elicited along the lateral aspect of the thigh, particularly over the iliotibial band.  Range of Motion: Active and passive range of motion within normal limits, though pain is noted with certain movements.  Neurovascular Exam: Distal pulses intact; no sensory deficits noted.    Special Tests:  Ghada's Test: Positive.  Katz Compression Test: Positive with pain elicited over the lateral femoral epicondyle.  David Test: Positive, reproducing lateral knee pain during left leg quad activation.  Beighton Score: Positive    Imaging:  Radiographs of the Left Thigh: No evidence of fracture, dislocation, or gross bony abnormality. Soft tissue appears  unremarkable.    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  Medication: Prescribe oral Diclofenac for pain management. Advise on potential side effects and monitoring.  Physical Therapy: Will be moving back to [place] very soon, would like to establish with PT there after receipt of MRI results, a specific physical therapy regimen will be recommended then. Patient is advised to establish care with a physical therapist in [place].  Genetic Testing: Referral provided for a genetic consult to confirm suspected Geovanni-Danlos Syndrome.  Activity Recommendations: Advise to avoid activities that exacerbate symptoms for now, particularly breaststroke swimming and long walks. Encourage low-impact exercises during acute phase.  Patient Education: Educate the patient about IT band syndrome and Geovanni-Danlos Syndrome. Discuss the importance of adhering to the prescribed treatment plan and the need for regular follow-up. The patient was counseled on her condition, treatment options, and the importance of follow-up. She understands the rationale for physical therapy and medication. The patient expressed understanding and agreed to the proposed plan.    Follow-Up: Schedule a follow-up once MRI is complete to review results.    Milton Murphy DO, SAMMI   Primary Care Sports Medicine    Department of Orthopaedic Surgery  [address] [address]   [address] [place] [id number]    t: [phone]  f: [phone]      [facility]

## 2024-01-11 ENCOUNTER — OFFICE VISIT (OUTPATIENT)
Dept: RHEUMATOLOGY | Facility: CLINIC | Age: 24
End: 2024-01-11

## 2024-01-11 VITALS
WEIGHT: 151 LBS | DIASTOLIC BLOOD PRESSURE: 85 MMHG | HEIGHT: 65 IN | HEART RATE: 81 BPM | SYSTOLIC BLOOD PRESSURE: 128 MMHG | BODY MASS INDEX: 25.16 KG/M2

## 2024-01-11 DIAGNOSIS — M25.50 HYPERMOBILITY ARTHRALGIA: ICD-10-CM

## 2024-01-11 DIAGNOSIS — M35.7 HYPERMOBILITY SYNDROME: Primary | ICD-10-CM

## 2024-01-11 PROCEDURE — 3074F SYST BP LT 130 MM HG: CPT | Performed by: INTERNAL MEDICINE

## 2024-01-11 PROCEDURE — 3008F BODY MASS INDEX DOCD: CPT | Performed by: INTERNAL MEDICINE

## 2024-01-11 PROCEDURE — 3079F DIAST BP 80-89 MM HG: CPT | Performed by: INTERNAL MEDICINE

## 2024-01-11 PROCEDURE — 99244 OFF/OP CNSLTJ NEW/EST MOD 40: CPT | Performed by: INTERNAL MEDICINE

## 2024-01-11 NOTE — PROGRESS NOTES
RHEUMATOLOGY CLINIC- NEW PATIENT    Vianca Santiago is a 23 year old female.    ASSESSMENT/PLAN:       ICD-10-CM    1. Hypermobility syndrome  M35.7 Physical Therapy Referral - External      2. Hypermobility arthralgia  M25.50 Physical Therapy Referral - External        DISCUSSION: Patient has been referred as a new patient for evaluation of polyarthralgias in the setting of hypermobility and positive Beighton hypermobility score.  She has a referral for genetics which I would also agree for more definitive diagnosis and to classify if and what type of Geovanni-Danlos the patient has.  Discussed how treatment options going forward could include NSAIDs, muscle relaxants, and neuropathic medications.  However, the patient is hoping to avoid chronic medication use.  She is also moving out of state soon for school.  Therefore, prescription/referral given for physical therapy.      PLAN:  - Agree with referral to genetics for workup of Geovanni-Danlos syndrome  - Referral given for physical therapy and given to patient  - Consult/evaluation communicated with referring physician/provider.  -MRI left thigh ordered per sports medicine/orthopedics      Patient may follow-up as needed    Alen Hazel DO  1/11/2024   3:52 PM    HPI:     Chief Complaint   Patient presents with    New Patient    Joint Pain    Wrist Pain     Jac Forearm pain     Shoulder Pain     Jac pain        I had the pleasure of seeing Vianca Santiago on 1/11/2024 as a new outpatient consultation for hypermobility. The patient was originally referred by her PCP.     23 year old female w/ PMH headaches, GERD, varicose veins who presents to clinic today.  Patient reporting polyarthralgias since about fourth grade I can affect multiple joints including her hands, ankles, knees, shoulders.  She describes her symptoms as \"heaviness\", \"tearing\", \"tightness\".  Her symptoms can persist throughout the day until noon.  Unknown relationship between activity as  activity can even worsen symptoms or improve them.  No prior issues with healing, bleeding.  She states that her shoulders and hip can \"pop out \"but she has never required surgical corrections.  ROS otherwise negative for fever, chills, visual abnormalities, recurrent epistaxis, oral/nasal ulcers, chest pain, shortness of breath, palpitations, Raynaud's phenomenon, rash, photosensitivity.  Family history notable for a mother with possible hypermobility as well.    Of note, patient formally following with rheumatology at [facility] (Dr. Jered Rodriguez) for hypermobility arthralgias.  Last appointment 11/24/2021.  During that appointment, patient feeling better with physical therapy without evidence of inflammatory arthritis, therefore, was recommended daily stretching and core strengthening exercises with as needed Tylenol for pain.      Medication History:    Flexeril 75 mg  Meloxicam 7.5 mg  Naproxen 375 mg  Medrol Dosepak (dispensed 10/30/2023)  Prednisone taper (dispense 10/30/2023)    Interval History:     See Above    HISTORY:  Past Medical History:   Diagnosis Date    Abdominal pain     Constipation     Eustachian tube dysfunction     WALKER      Social Hx Reviewed   Family Hx Reviewed     Medications (Active prior to today's visit):  Current Outpatient Medications   Medication Sig Dispense Refill    diclofenac 75 MG Oral Tab EC Take 1 tablet (75 mg total) by mouth 2 (two) times daily. 28 tablet 1    ergocalciferol 1.25 MG (34435 UT) Oral Cap Take 1 capsule (50,000 Units total) by mouth once a week. 12 capsule 1    Clobetasol Propionate (CLOBEX) 0.05 % External Shampoo Apply to scalp leave on 5-10mintues, lather, rinse out 3 times weekly as directed 120 mL 11    Ruxolitinib Phosphate (OPZELURA) 1.5 % External Cream Apply 1 Application topically every evening. 60 g 2    Azelaic Acid (FINACEA) 15 % External Foam Apply 1 Application topically daily. 50 g 12    clindamycin 1 % External Lotion  Apply 1 Application  topically 2 (two) times daily. Apply thin film to affected area(s). 60 mL 6    Ergocalciferol (VITAMIN D OR) Take by mouth. (Patient not taking: Reported on 12/30/2023)         Allergies:  No Known Allergies      ROS:   Review of Systems   Constitutional:  Negative for chills and fever.   HENT:  Negative for congestion, hearing loss, mouth sores, nosebleeds and trouble swallowing.    Eyes:  Negative for photophobia, pain, redness and visual disturbance.   Respiratory:  Negative for cough and shortness of breath.    Cardiovascular:  Negative for chest pain, palpitations and leg swelling.   Gastrointestinal:  Negative for abdominal pain, blood in stool, diarrhea and nausea.   Endocrine: Negative for cold intolerance and heat intolerance.   Genitourinary:  Negative for dysuria, frequency and hematuria.   Musculoskeletal:  Positive for arthralgias, back pain, myalgias and neck pain. Negative for gait problem, joint swelling and neck stiffness.   Skin:  Negative for color change and rash.   Neurological:  Positive for headaches. Negative for dizziness, weakness and numbness.   Psychiatric/Behavioral:  Negative for confusion and dysphoric mood.         PHYSICAL EXAM:     Constitutional:  Well developed, Well nourished, No acute distress  HENT:  Normocephalic, Atraumatic, Bilateral external ears normal, Oropharynx moist, No oral exudates.  Neck: Normal range of motion, No tenderness, Supple, No stridor.  Eyes:  PERRL, EOMI, Conjunctiva normal, No discharge.  Respiratory:  Normal breath sounds, No respiratory distress, No wheezing.  Cardiovascular:  Normal heart rate, Normal rhythm, No murmurs, No rubs, No gallops.  GI:  Bowel sounds normal, Soft, No tenderness, No masses, No pulsatile masses.  : No CVA tenderness.  Musculoskeletal:  A comprehensive 28 count joint exam was done and was negative for swelling or tenderness except as noted. Inspections for misalignment, asymmetry, crepitation, defects,  tenderness, masses, nodules, effusions, range of motion, and stability in the upper and lower extremities bilaterally are all normal unless noted.      There is currently no information documented on the homunculus. Go to the Rheumatology activity and [facility].     Integument:  Warm, Dry, No erythema, No rash.  Lymphatic:  No lymphadenopathy noted.  Neurologic:  Alert & oriented x 3, Normal motor function, Normal sensory function, No focal deficits noted.  Psychiatric:  Affect normal, Judgment normal, Mood normal.         BEIGHTON SCORE                    LEFT             RIGHT   Fifth digit testing: Can the fifth digit be been/lifted upwards at the knuckles to go back beyond 90 degrees?                       0                       0   First digit testing: With the palm facing down and the wrists full bent downward, can the thumb be pushed back to touch the forearm?                       +1                      +1   Knee testing: While standing with the knees locked, do the lower part of either leg extend past 10 degrees forward?                       +1                      +1     Elbow testing: Can the elbow be bent more than 10 degrees?                       +1                      +1   Spinal testing: While bending forward, can both hands be placed flat on the floor without bending the knees:                                                              +1     Total score:                     (>4 c/w hypermobility)     ________        LABS:     Prior lab work reviewed and notable for:    2023  TSH 1.090 WNL  Ferritin 35.8 WNL  CRP less than 0.29 WNL  KIMBERLY IFA negative    3/19/2020  RF less than 10  CCP IgG 5.7 (high)  ESR 8 WNL  CRP 0.1 WNL    3/5/2020  RF less than 10  CCP +6.1  Imagin/8/2024 left tibia/fibula XR:      Impression   CONCLUSION:  No evidence of acute displaced fracture or dislocation in the left tibia/fibula.

## 2024-01-31 ENCOUNTER — TELEPHONE (OUTPATIENT)
Dept: ORTHOPEDICS CLINIC | Facility: CLINIC | Age: 24
End: 2024-01-31

## 2024-01-31 NOTE — TELEPHONE ENCOUNTER
Results in EMR- MRI Thigh Left done at [facility]~ Please advise once reviewed and and if recommendations.     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    Thanks!

## 2024-01-31 NOTE — TELEPHONE ENCOUNTER
Pt's mom called stating that she needs a nurse to call her and walk through the MRI results.    I advised pt that we would need a verbal release on file as pt does not currently have one on file.    Pt is in the process of uploading a current verbal however, pt's mom would still like a phone call since pt is out of state for college.    Pt's mom can be reached at [phone]

## 2024-02-02 NOTE — TELEPHONE ENCOUNTER
Called yesterday and this am, would recommend televisit if possible for protected time to review results but MRI is unremarkable. Pain likely due to her hypermobility as discussed at her visit.

## 2024-02-08 ENCOUNTER — VIRTUAL PHONE E/M (OUTPATIENT)
Dept: ORTHOPEDICS CLINIC | Facility: CLINIC | Age: 24
End: 2024-02-08
Payer: COMMERCIAL

## 2024-02-08 DIAGNOSIS — Q79.60 EHLERS-DANLOS SYNDROME: ICD-10-CM

## 2024-02-08 DIAGNOSIS — M25.50 HYPERMOBILITY ARTHRALGIA: Primary | ICD-10-CM

## 2024-02-08 PROCEDURE — 99443 PHONE E/M BY PHYS 21-30 MIN: CPT | Performed by: FAMILY MEDICINE

## 2024-02-08 NOTE — PROGRESS NOTES
Sports Medicine Clinic Note     Subjective:    Chief Complaint: Follow-up tele visit for left lateral thigh pain and review of recent MRI findings.    Interval History of Present Illness: Patient reports ongoing left lateral leg pain with improvement since last visit attributed to Diclofenac. She stopped taking diclofenac for a couple of days to see if symptoms would stay abated and noticed that the pain seem to return. Denies any new injury or exacerbating factors. Has been adhering to previous advice on activity modification. Patient has not yet established with a physical therapist in [place] due to recent relocation but intends to do so.    Review of Systems: No new symptoms reported. Patient continues to manage chronic pain related to hypermobility and does not report acute distress in other joints.    Objective:    Imaging Review: MRI of the left thigh indicates questionable subtle thickening of the superficial perimuscular fascia of the vastus lateralis muscle without associated edema, nodule, or tear. Findings are of uncertain clinical significance.    Assessment:    Iliotibial Band Syndrome: MRI findings do not significantly alter the diagnosis. The condition persists and is likely contributing to the patient's symptoms.  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 Awaiting genetic consultation.  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    Plan:    Diagnostic Imaging: MRI completed. No further imaging recommended at this time.  Medication: Continue oral Diclofenac as previously prescribed. Monitor for side effects.  Physical Therapy: Strongly encourage the patient to establish with a physical therapist in [place] as soon as possible. A focus on strengthening and stabilizing exercises around the hip, knee, and core, along with specific IT band syndrome management strategies, is  recommended.  Genetic Testing: Follow up on referral for genetic consultation for suspected Geovanni-Danlos Syndrome.  Activity Recommendations: Continue to advise on avoidance of exacerbating activities. Encourage gradual return to swimming.  Patient Education: Reinforce the importance of adherence to the treatment plan, including medication, physical therapy, and follow-up appointments. Discuss the significance of the MRI findings and their implication in her care plan.    Follow-Up: Schedule a follow-up appointment as needed to assess progress after establishing with physical therapy. Include review of any new symptoms or changes in condition.    Audio Telehealth Visit:    This clinician is part of the telehealth program and is conducting this visit in a currently approved location. For this visit the clinician and patient were present via interactive audio telecommunications system that permits two-way, real-time/synchronous communications. This has been done in good shanique to provide continuity of care in the best interest of the provider-patient relationship, due to the ongoing public health crisis/national emergency and because of restrictions of visitation. There are limitations of this visit as no hands-on physical examination could be performed.    Patient consent given for telehealth visit: Yes  Patient and clinician are currently located in the state [facility].  The clinician is appropriately licensed in the above state to provide care for this visit.  Other individuals present during the telehealth encounter and their role/relation: N/a  Total time spent in medical audio consultation (required if billing based on time): 30  Total time spent providing education, coordination of care/services, and counselin    This billing was spent on history taking; observational physical exam; review of labs, medications, and radiology tests; and decision making.  Appropriate medical decision-making and tests are  ordered as detailed in the plan of care above.    Milton Murphy DO, CAQSM   Primary Care Sports Medicine    Department of Orthopaedic Surgery  [place] [id number] [place] [id number]   [address] [place] [id number]    t: [phone]  f: [phone]      [facility]

## 2024-04-23 ENCOUNTER — TELEPHONE (OUTPATIENT)
Dept: DERMATOLOGY CLINIC | Facility: CLINIC | Age: 24
End: 2024-04-23

## 2024-04-23 NOTE — TELEPHONE ENCOUNTER
LOV 12/30/23, s/w pt's mom (pt away at school) mom states pt has been using clobetasol shampoo and \" Psoriasis 3% salicylic acid shampoo\" (not coal tar) and is still getting dandruff. No itching. Mom requests something stronger \"specifically for dandruff\" - please advise. [facility] [address].     Also needs refills on cleocin.   Per mom - ok to leave detailed msg on VM

## 2024-04-25 RX ORDER — CLINDAMYCIN PHOSPHATE 10 UG/ML
1 LOTION TOPICAL 2 TIMES DAILY
Qty: 60 ML | Refills: 6 | Status: SHIPPED | OUTPATIENT
Start: 2024-04-25

## 2024-04-25 RX ORDER — CLOBETASOL PROPIONATE 0.5 MG/G
AEROSOL, FOAM TOPICAL
Qty: 100 G | Refills: 3 | Status: SHIPPED | OUTPATIENT
Start: 2024-04-25

## 2024-04-25 NOTE — TELEPHONE ENCOUNTER
MyChart message sent per .     \"There is not a specific medication for dandruff per se.  Clobetasol foam sent.  The is nongreasy, no alcohol.  Application instructions patient should squirt this into the.  This mousse formulation melts on contact with the skin at body temperature becomes liquid if squirted into the hand.  Is stable at room temperature for at least 12 hours.  And can be reapplied without washing out first.  This does look like it is covered.  Rx sent  Refill sent, for clindamycin\"

## 2024-04-25 NOTE — TELEPHONE ENCOUNTER
Refill sent, for clindamycin    There is not a specific medication for dandruff per se.  Clobetasol foam sent.  The is nongreasy, no alcohol.  Application instructions patient should squirt this into the.  This mousse formulation melts on contact with the skin at body temperature becomes liquid if squirted into the hand.  Is stable at room temperature for at least 12 hours.  And can be reapplied without washing out first.  This does look like it is covered.  Rx sent   Speaking Coherently

## 2025-03-14 ENCOUNTER — TELEPHONE (OUTPATIENT)
Age: 25
End: 2025-03-14

## 2025-06-04 ENCOUNTER — OFFICE VISIT (OUTPATIENT)
Age: 25
End: 2025-06-04
Payer: COMMERCIAL

## 2025-06-04 VITALS
OXYGEN SATURATION: 97 % | DIASTOLIC BLOOD PRESSURE: 78 MMHG | SYSTOLIC BLOOD PRESSURE: 115 MMHG | HEART RATE: 83 BPM | WEIGHT: 169.4 LBS

## 2025-06-04 DIAGNOSIS — M25.50 POLYARTHRALGIA: Primary | ICD-10-CM

## 2025-06-04 DIAGNOSIS — G89.29 CHRONIC MIDLINE LOW BACK PAIN WITHOUT SCIATICA: ICD-10-CM

## 2025-06-04 DIAGNOSIS — M54.50 CHRONIC MIDLINE LOW BACK PAIN WITHOUT SCIATICA: ICD-10-CM

## 2025-06-04 DIAGNOSIS — Z01.89 ENCOUNTER FOR OTHER SPECIFIED SPECIAL EXAMINATIONS: ICD-10-CM

## 2025-06-04 DIAGNOSIS — E55.9 VITAMIN D DEFICIENCY: ICD-10-CM

## 2025-06-04 DIAGNOSIS — R76.8 ANA POSITIVE: ICD-10-CM

## 2025-06-04 PROCEDURE — 99204 OFFICE O/P NEW MOD 45 MIN: CPT | Performed by: NURSE PRACTITIONER

## 2025-06-04 RX ORDER — CHOLECALCIFEROL (VITAMIN D3) 50 MCG
2000 TABLET ORAL DAILY
COMMUNITY
Start: 2025-01-28

## 2025-06-04 NOTE — PATIENT INSTRUCTIONS
Complete ordered labs and imaging  We will discuss results at next visit  RTC 2 weeks      We are committed to providing you the best care possible.    If you receive a survey after visiting one of our offices, please take time to share your experience concerning your physician office visit.  These surveys are confidential and no health information about you is shared.    We are eager to improve for you and we are counting on your feedback to help make that happen.

## 2025-06-04 NOTE — PROGRESS NOTES
oral ulcers  Respiratory:  Denies cough or shortness of breath   Cardiovascular:  Denies chest pain or edema   GI:  Denies abdominal pain, nausea, vomiting, bloody stools or diarrhea   :  Denies dysuria or hematuria  Musculoskeletal:  See HPI  Integument:  Denies rash   Neurologic:  Denies headache, focal weakness or sensory changes   Endocrine:  Denies polyuria or polydipsia   Lymphatic:  Denies swollen glands   Psychiatric:  Denies depression or anxiety       PROBLEM LIST    There is no problem list on file for this patient.      MEDICATIONS    No current outpatient medications on file.     No current facility-administered medications for this visit.       ALLERGIES    Not on File    PAST MEDICAL HISTORY      No past medical history on file.      SOCIAL HISTORY     Social History     Socioeconomic History    Marital status: Unknown         FAMILY HISTORY     No family history on file.      PHYSICAL EXAM     Wt Readings from Last 3 Encounters:   No data found for Wt     Temp Readings from Last 3 Encounters:   No data found for Temp     BP Readings from Last 3 Encounters:   No data found for BP     Pulse Readings from Last 3 Encounters:   No data found for Pulse       General appearance:  Alert and oriented, NAD, well developed   HEENT: EOMI, no scleral injection, moist mucous membranes, no oral ulcers, normal hearing, no cartilage inflammation  Neck: Trachea midline, no masses  Lymph: no LAD  Lungs: CTAB, no rales  Heart: regular rate and rhythm, S1, S2 normal, no murmur, no lower extremity edema  Abdomen: Soft, ND, NT. + BS.  Extremities: atraumatic, no cyanosis or edema.   Neurologic: CN 2-12 grossly intact.   Skin: No active rashes, warm and dry, no telangiectasias, no digital pitting, no sclerodactyly, no rheumatoid nodules, no livedo  MSK: normal ROM of the small joints of the hands, wrists, elbows, shoulders, hips, knees, ankles, or MTPs. Left hips and leg pain with and without palpation  5/5 strength of

## 2025-06-05 ENCOUNTER — HOSPITAL ENCOUNTER (OUTPATIENT)
Dept: GENERAL RADIOLOGY | Age: 25
Discharge: HOME | End: 2025-06-05
Payer: COMMERCIAL

## 2025-06-05 ENCOUNTER — HOSPITAL ENCOUNTER (OUTPATIENT)
Age: 25
Discharge: HOME | End: 2025-06-05
Payer: COMMERCIAL

## 2025-06-05 DIAGNOSIS — M25.50 POLYARTHRALGIA: ICD-10-CM

## 2025-06-05 DIAGNOSIS — Z01.89 ENCOUNTER FOR OTHER SPECIFIED SPECIAL EXAMINATIONS: ICD-10-CM

## 2025-06-05 DIAGNOSIS — G89.29 CHRONIC MIDLINE LOW BACK PAIN WITHOUT SCIATICA: ICD-10-CM

## 2025-06-05 DIAGNOSIS — M54.50 CHRONIC MIDLINE LOW BACK PAIN WITHOUT SCIATICA: ICD-10-CM

## 2025-06-05 LAB
25(OH)D3 SERPL-MCNC: 23.4 NG/ML (ref 30–150)
ALBUMIN SERPL-MCNC: 4.6 G/DL (ref 3.4–5)
ALBUMIN/GLOB SERPL: 1.9 {RATIO} (ref 1.1–2.2)
ALBUMIN: 4.6 G/DL (ref 3.4–5)
ALP SERPL-CCNC: 64 U/L (ref 40–129)
ALT SERPL-CCNC: 14 U/L (ref 10–40)
ANION GAP SERPL CALCULATED.3IONS-SCNC: 12 MMOL/L (ref 9–17)
AST SERPL-CCNC: 22 U/L (ref 15–37)
BILIRUB DIRECT SERPL-MCNC: <0.2 MG/DL (ref 0–0.3)
BILIRUB INDIRECT SERPL-MCNC: NORMAL MG/DL (ref 0–0.7)
BILIRUB SERPL-MCNC: 0.3 MG/DL (ref 0–1)
BUN SERPL-MCNC: 11 MG/DL (ref 7–20)
CALCIUM SERPL-MCNC: 9.7 MG/DL (ref 8.3–10.6)
CHLORIDE SERPL-SCNC: 100 MMOL/L (ref 99–110)
CO2 SERPL-SCNC: 24 MMOL/L (ref 21–32)
CREAT SERPL-MCNC: 0.7 MG/DL (ref 0.6–1.1)
CRP SERPL HS-MCNC: <3 MG/L (ref 0–5)
ERYTHROCYTE [DISTWIDTH] IN BLOOD BY AUTOMATED COUNT: 13.1 % (ref 11.7–14.9)
ERYTHROCYTE [SEDIMENTATION RATE] IN BLOOD BY WESTERGREN METHOD: 4 MM/HR (ref 0–20)
GFR, ESTIMATED: >90 ML/MIN/1.73M2
GLUCOSE SERPL-MCNC: 79 MG/DL (ref 74–99)
HAV IGM SERPL QL IA: NONREACTIVE
HBV CORE IGM SERPL QL IA: NONREACTIVE
HBV SURFACE AG SERPL QL IA: NONREACTIVE
HCT VFR BLD AUTO: 44.8 % (ref 37–47)
HCV AB SERPL QL IA: NONREACTIVE
HGB BLD-MCNC: 14.4 G/DL (ref 12.5–16)
MCH RBC QN AUTO: 28.6 PG (ref 27–31)
MCHC RBC AUTO-ENTMCNC: 32.1 G/DL (ref 32–36)
MCV RBC AUTO: 88.9 FL (ref 78–100)
PHOSPHATE SERPL-MCNC: 3.4 MG/DL (ref 2.5–4.9)
PLATELET # BLD AUTO: 277 K/UL (ref 140–440)
PMV BLD AUTO: 10.5 FL (ref 7.5–11.1)
POTASSIUM SERPL-SCNC: 3.9 MMOL/L (ref 3.5–5.1)
PROT SERPL-MCNC: 6.9 G/DL (ref 6.4–8.2)
RBC # BLD AUTO: 5.04 M/UL (ref 4.2–5.4)
SODIUM SERPL-SCNC: 136 MMOL/L (ref 136–145)
URATE SERPL-MCNC: 4.8 MG/DL (ref 2.6–6)
WBC OTHER # BLD: 9.8 K/UL (ref 4–10.5)

## 2025-06-05 PROCEDURE — 73130 X-RAY EXAM OF HAND: CPT

## 2025-06-05 PROCEDURE — 84550 ASSAY OF BLOOD/URIC ACID: CPT

## 2025-06-05 PROCEDURE — 85652 RBC SED RATE AUTOMATED: CPT

## 2025-06-05 PROCEDURE — 86480 TB TEST CELL IMMUN MEASURE: CPT

## 2025-06-05 PROCEDURE — 36415 COLL VENOUS BLD VENIPUNCTURE: CPT

## 2025-06-05 PROCEDURE — 82306 VITAMIN D 25 HYDROXY: CPT

## 2025-06-05 PROCEDURE — 86431 RHEUMATOID FACTOR QUANT: CPT

## 2025-06-05 PROCEDURE — 86140 C-REACTIVE PROTEIN: CPT

## 2025-06-05 PROCEDURE — 80069 RENAL FUNCTION PANEL: CPT

## 2025-06-05 PROCEDURE — 86812 HLA TYPING A B OR C: CPT

## 2025-06-05 PROCEDURE — 80074 ACUTE HEPATITIS PANEL: CPT

## 2025-06-05 PROCEDURE — 85027 COMPLETE CBC AUTOMATED: CPT

## 2025-06-05 PROCEDURE — 80076 HEPATIC FUNCTION PANEL: CPT

## 2025-06-06 LAB — RHEUMATOID FACTOR: <10 IU/ML

## 2025-06-07 LAB
QUANTI TB GOLD PLUS: NEGATIVE
QUANTI TB1 MINUS NIL: 0.01 IU/ML
QUANTI TB2 MINUS NIL: 0 IU/ML
QUANTIFERON MITOGEN: 9.98 IU/ML
QUANTIFERON NIL: 0.02 IU/ML

## 2025-06-08 LAB — HLA B27: NEGATIVE

## 2025-06-11 ENCOUNTER — TELEPHONE (OUTPATIENT)
Age: 25
End: 2025-06-11

## 2025-06-11 NOTE — TELEPHONE ENCOUNTER
I received part of the pt's AVISE lab results, but not the full panel. Can you check to see if the remaining panel has been resulted? Thank you!

## 2025-06-19 ENCOUNTER — OFFICE VISIT (OUTPATIENT)
Age: 25
End: 2025-06-19
Payer: COMMERCIAL

## 2025-06-19 VITALS — SYSTOLIC BLOOD PRESSURE: 120 MMHG | DIASTOLIC BLOOD PRESSURE: 74 MMHG | WEIGHT: 165 LBS | HEART RATE: 75 BPM

## 2025-06-19 DIAGNOSIS — M54.50 CHRONIC MIDLINE LOW BACK PAIN WITHOUT SCIATICA: ICD-10-CM

## 2025-06-19 DIAGNOSIS — G89.29 CHRONIC MIDLINE LOW BACK PAIN WITHOUT SCIATICA: ICD-10-CM

## 2025-06-19 DIAGNOSIS — E55.9 VITAMIN D DEFICIENCY: ICD-10-CM

## 2025-06-19 DIAGNOSIS — M25.50 POLYARTHRALGIA: Primary | ICD-10-CM

## 2025-06-19 PROCEDURE — 99213 OFFICE O/P EST LOW 20 MIN: CPT | Performed by: NURSE PRACTITIONER

## 2025-06-19 NOTE — PROGRESS NOTES
RHEUMATOLOGY Follow Up VISIT    2025      Patient Name: Eliane Burden  : 2000  Medical Record: [id number]      CHIEF COMPLAINT    Positive EMORY  Polyarthralgia    Pertinent Problems    HISTORY OF PRESENT ILLNESS    Eliane Burden is a 24 y.o. female who was referred by  . Pt presents today with generalized pain, fatigue, pins/needles sensation, and intermittent muscle weakness X 10 years. Pt notes her symptoms can last 40-60min, and notes pain that awakens her at night. According to the pt, she has pain in her fingers, wrists, knees, and toes. Pt reports taking OTC pain medication without much relief. Pt reports seeing a rheumatologist in the past, and states her inflammatory makers were elevated, but the pt denies starting medication.     LOV 2025  Today, patient describes joint pain, swelling and redness in fingers, wrists, knees, and toes  HLA B27 neg  RF <10.0  CRP <3.0  ESR 4  Vitamin D 23.4L  Renal/Liver function wnl  AVISE labs: negative  Pain level today: 6/10    XR bilateral hands 2025    IMPRESSION:     1.  No evidence for acute abnormality.      Subjective  Pt presents today to review her labs and imaging. Today the pt reports a feeling of muscle tightness to her arms and legs. Pt states that OTC Tylenol/Ibuprofen have given her some relief. Pt rates current pain as 4/10.     Joint stiffness in am lasting 60 minutes  Relieving factors: cool compress  Worsening factors: none  Associated symptoms:  Difficulty with ADLs: none  Pain level today: 4/10  No recent hospitalizations or fever/infections while on medication  Last eye exam   Functional status: Research assistant     Other rheumatologic symptoms :  Denies chest pain, SOB, fever, rash, oral/nasal ulcers, change in mental status, sicca symptoms, Muscle pain, muscle weakness, raynaud's, puffy fingers or skin thickening. History of miscarriages, blood clots, dactylitis, uveitis, enthesitis, psoriasis, buttock pain, change in

## 2025-06-19 NOTE — PATIENT INSTRUCTIONS
Continue taking OTC Tylenol and Ibuprofen as needed for pain  Continue with exercises from past PT sessions  RTC 2 months

## (undated) NOTE — ED AVS SNAPSHOT
Emanuel Chan   MRN: [id number]    Department:  [facility] Department   Date of Visit:  3/6/2020           Disclosure     Insurance plans vary and the physician(s) referred by the ER may not be covered by your plan.  Please contact CARE PHYSICIAN AT ONCE OR RETURN IMMEDIATELY TO THE EMERGENCY DEPARTMENT. If you have been prescribed any medication(s), please fill your prescription right away and begin taking the medication(s) as directed.   If you believe that any of the medications